# Patient Record
(demographics unavailable — no encounter records)

---

## 2024-10-23 NOTE — DATA REVIEWED
[FreeTextEntry1] : LABS: 10/21/2024: UACR 50 Creatinine 1.51 A1c 7.5%  5/17/2024: Creatinine 1.35 (GFR 59) A1c 7.3% LDL 73 UACR 14  1/9/2024: Creatinine 1.17 A1c 5.9% LDL 66  6/19/2023: Trig 257 LDL 83 Creatinine 1.38 A1c 10.2%

## 2024-10-23 NOTE — ASSESSMENT
[FreeTextEntry1] : 64 year old male with type 2 DM with associated nephropathy, HTN, hyperlipidemia and vitamin D deficiency here for follow up.    His diabetes control has worsened slightly.    1.  T2DM-      Increase Mounjaro to 10 mg qweek.  Continue Jardiance.  Will add Repaglinide 0.5 mg 3XD before meals to reduce PP hyperglycemia.  The risks of hypoglycemia with this medication was discussed in detail.   2.  HTN-  Continue Losartan 3.  Hyperlipidemia-    Continue atorvastatin.     Follow up in 4 months.

## 2024-10-23 NOTE — HISTORY OF PRESENT ILLNESS
[FreeTextEntry1] : Follow up DM, hyperlipidemia.     History of elevated TSH, but repeat TFTs were normal with no sign of AITD.    History of moderate Type 2 DM, diagnosed in 2012.  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  His control improved after gastric bypass in 2017.   He is currently following with renal.    SMBG:  Prior to CGM, was testing 4 times a day. Currently using Freestyle Ger 2:  Average BG is 155  mg/dl with CV of 35%.  74% of BG within target, 1% below target and 25 % above target.   Having frequent postprandial BG spikes.     Current Diabetic Medication Regimen: Jardiance 25 mg  (has been taking only 1/2 tab per day on this).   Mounjaro 7.5 mg qweek Basal insulin was stopped in past due to hypoglycemia.  Stopped metformin in the past due to VITO and GI upset.    Tolerating mounjaro well without nausea or constipation.

## 2025-02-24 NOTE — HISTORY OF PRESENT ILLNESS
[FreeTextEntry1] : Follow up DM, hyperlipidemia.     History of elevated TSH, but repeat TFTs were normal with no sign of AITD.    History of moderate Type 2 DM, diagnosed in 2012.  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  His control improved after gastric bypass in 2017.    SMBG:  Prior to CGM, was testing 4 times a day. Currently using Freestyle Ger 2:  Average BG is 155  mg/dl with CV of 36%.  74% of BG within target, 0% below target and 256% above target.   Having frequent postprandial BG spikes.     Current Diabetic Medication Regimen: Jardiance 25 mg  (has been taking only 1/2 tab per day on this).   Mounjaro 10 mg qweek (increased last viist) Last visit, given Rx for Repaglinide, but developed hypoglycemia on this, so stopped.     Basal insulin was stopped in past due to hypoglycemia.  Stopped metformin in the past due to VITO and GI upset.    Tolerating mounjaro well without nausea.

## 2025-02-24 NOTE — ASSESSMENT
[FreeTextEntry1] : 64 year old male with type 2 DM with associated nephropathy, HTN, hyperlipidemia and vitamin D deficiency here for follow up.    His diabetes control is improving.      1.  T2DM-      Continue Mounjaro, Jardiance and  Repaglinide.  2.  HTN-  Continue Losartan 3.  Hyperlipidemia-    Continue atorvastatin.     Follow up in 4- 6 months.

## 2025-07-01 NOTE — DATA REVIEWED
[FreeTextEntry1] : LABS: 6/26/2025: A1c 6.5% TSH 3.65 UACR 17  10/21/2024: UACR 50 Creatinine 1.51 A1c 7.5%  5/17/2024: Creatinine 1.35 (GFR 59) A1c 7.3% LDL 73 UACR 14  1/9/2024: Creatinine 1.17 A1c 5.9% LDL 66  6/19/2023: Trig 257 LDL 83 Creatinine 1.38 A1c 10.2%

## 2025-07-01 NOTE — HISTORY OF PRESENT ILLNESS
[FreeTextEntry1] : Follow up DM, hyperlipidemia.     History of elevated TSH, but repeat TFTs were normal with no sign of AITD.    History of moderate Type 2 DM, diagnosed in 2012.  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  His control improved after gastric bypass in 2017.    SMBG:  Prior to CGM, was testing 4 times a day. Currently using Freestyle Ger 2:  Average BG is 138  mg/dl with CV of 40%.  80% of BG within target, 1% below target and 19% above target.   Having frequent postprandial BG spikes.     Current Diabetic Medication Regimen: Jardiance 25 mg  (now taking full tablet)  Mounjaro 10 mg qweek  Stopped taking repaglinide due to hypoglycemia.       Basal insulin was stopped in past due to hypoglycemia.  Stopped metformin in the past due to VITO and GI upset.    Complains of mild weight gain.  no nausea or constipation.

## 2025-07-01 NOTE — ASSESSMENT
[FreeTextEntry1] : 65 year old male with type 2 DM with associated nephropathy, HTN, hyperlipidemia and vitamin D deficiency here for follow up.    His diabetes control is improving.      1.  T2DM-      Continue current dose of Jardiance.  Increase Mounjaro to 12.5 mg qweek to reduce postprandial glucose excursions.   2.  HTN-  Continue Losartan 3.  Hyperlipidemia-    Continue atorvastatin.     Follow up in 4- 6 months.

## 2025-07-03 NOTE — HISTORY OF PRESENT ILLNESS
[FreeTextEntry1] : Yordy Ortiz is a 65 year old male with medical history significant for HTN, HLD, T2DM, hypothyroidism, SHELLIE, GHISLAINE on CPAP presenting today for evaluation.  HPI:  He first noticed forgetfulness about 4 years ago. He was evaluated by a neurologist in the past, who did MRI and was told his memory changes were normal.  He described as forgetting things as he is doing them, or forgetting dates of events or appointments.  He follows with [facility] for anxiety and depression since 2001, has been on venlafaxine/Effexor 150 mg twice daily for years, and states his mood is well-controlled.  He had recent MoCA 27/30 with his psychiatrist, few weeks ago. He is compliant with CPAP, and his sleep is good.  He has a follow-up later this month because he is due for a new machine.  He lives by himself in a condo, but he has family nearby, including 2 daughters, and he does see siblings regularly.  He is independent on a day-to-day basis, no safety concerns.  He cooks for himself and there has been no instances of leaving kitchen appliances on.  He drinks 36 to 64 ounces of water daily.  Only caffeinated beverages Coca[employer]Cola.  Hobbies include collecting records and restoring [employer] audio equipment.  Highest level of education is high school, and then he worked as a  for [employer]. He had recent b/w at [place] with PCP Dr. Sean Doherty. He also has bilateral hand shakes, First noticed in 2021.  It has gotten more intense over the past few years.  It primarily occurs when he is trying to do something with his hands.  He is right-hand dominant, and says his writing is now sloppy.  The tremors are also making his day-to-day difficult, it affects his mealtimes, and hobbies.  His hand shakes when he tries to play guitar, so he finds himself less inclined to partake in these activities involving his hands.  No shaking in legs, head, or voice.  He denies any family history of tremors.  He denies shuffling gait or movements feeling stiff or slow.  No issues with walking or balance.  He was on metoprolol in the past for blood pressure control, however this was recently discontinued in 1/2025 because of intolerable lightheadedness.  He also endorses a history of asthma in remote past.  MRI BRAIN 5/08/2020 Mild chronic microvascular white matter ischemic changes.  Difficulty remembering: people/faces + sometimes, friends, coworkers things + places - words - facts + dates + has to keep in calendar phone  Getting lost no   ADLS: bathing, grooming, getting dressed, feeding, preparing meals - independent Finances: manages own finances, doing well.  Medications: good, no missed doses Driving: sometimes a little lost.  Mood: well controlled Disinhibition: NONE  Family support: yes

## 2025-07-03 NOTE — PHYSICAL EXAM
[FreeTextEntry1] : NEURO: Mental Status Speech is clear, fluent, and spontaneous. MoCA 22/30, 23/30 with adjustment for education level.    Cranial Nerves Visual fields full to confrontation Pupils equal, round, reactive to light. Extraocular movements intact. No nystagmus or ptosis. Facial sensation intact and symmetric in V1, V2, V3 Facial movement intact and symmetric Uvula midline, soft palate elevates normally Bilateral shoulder shrug intact Tongue midline, no tongue bite sign or deviation on protrusion   Motor Tone and bulk intact No cogwheeling Shoulder abduction: 5/5 b/l Elbow flexion/extension: 5/5 bl Hand : 5/5 b/l Hip flexion/extension: 5/5 b/l Knee flexion/extension: 5/5 b/l No pronator drift   Sensation Light touch grossly intact   Deep Tendon Reflexes Biceps 2+ b/l Patellar 2+ b/l Ankle 2+ b/l   Coordination FTN with intention tremor b/l Sentence writing, drawing with minimal oscillation  Gait Normal stance, stride, and pivot turn No shuffling     GEN: awake, alert, interactive, no acute distress EYES: sclera white, conjunctiva clear, no redness or discharge ENT: normal appearing outer ears, hearing grossly intact to finger rub b/l PULM: normal respiratory rhythm and effort, speaking in full sentences without distress, no accessory muscle usage EXT: moving all extremities spontaneously SKIN: warm, dry

## 2025-07-03 NOTE — ASSESSMENT
[FreeTextEntry1] : 65 year old male with medical history significant for HTN, HLD, T2DM, hypothyroidism, SHELLIE, GHISLAINE on CPAP presenting today for evaluation. MRI BRAIN 5/08/2020 Mild chronic microvascular white matter ischemic changes.  Recommendations:   # Memory impairment - 23/30 MoCA today. - MRI brain to evaluate for structural abnormalities, atrophy - laboratory work up for memory- will also request recent b/w from [facility] - neuropsychological testing referral for further cognitive evaluation - routine EEG to evaluate for cerebral dysfunction - lifestyle modifications discussed - sleep hygiene, use of memory aides such as calendars or to do lists, limit multitasking and distractibility  # Tremor - with action, b/l hands, likely essential.  - pt should avoid beta blockers given h/o asthma and intolerable SE in past - start primidone 50 mg take 1/2 tab qhs for TDD 25 mg - SE profile discussed - increase hydration, limit stress and caffeine intake  Patient to return to office in 4-6 weeks, or sooner if needed. Patient understands to seek urgent medical evaluation for any new or worsening symptoms.